# Patient Record
Sex: MALE | Race: WHITE
[De-identification: names, ages, dates, MRNs, and addresses within clinical notes are randomized per-mention and may not be internally consistent; named-entity substitution may affect disease eponyms.]

---

## 2023-01-16 ENCOUNTER — HOSPITAL ENCOUNTER (OUTPATIENT)
Dept: HOSPITAL 76 - DI | Age: 53
Discharge: HOME | End: 2023-01-16
Attending: INTERNAL MEDICINE
Payer: COMMERCIAL

## 2023-01-16 DIAGNOSIS — R22.2: Primary | ICD-10-CM

## 2023-01-17 NOTE — ULTRASOUND REPORT
PROCEDURE:  Chest

 

INDICATIONS:  MASS OF BACK

 

TECHNIQUE:  

Real-time scanning was performed, and a suitable site was marked by the sonographer for thoracentesis
 to be performed by the referring clinician.  

 

COMPARISON:  None.

 

FINDINGS:  

Mass the area of concern in the left upper back near the base of the neck is very well circumscribed 
and measures 6.5 x 3.6 x 5.9 cm and is heterogeneously hypoechoic relative to the skin and adjacent s
ubcutaneous fat. There is no clear connection to the skin.

 

IMPRESSION:  

Nonspecific well-circumscribed subcutaneous mass. This could represent a lipoma although the echogeni
city and complexity of the lesion is significantly different from that of the adjacent subcutaneous f
at. Dermoid or sebaceous cyst are additional differential considerations. Tissue diagnosis fabrizio whitlock

 

Reviewed by: Sina Velasco MD on 1/17/2023 3:33 PM PST

Approved by: Sina Velasco MD on 1/17/2023 3:33 PM PST

 

 

Station ID:  [id number]

## 2023-04-21 ENCOUNTER — HOSPITAL ENCOUNTER (OUTPATIENT)
Dept: HOSPITAL 76 - SDS | Age: 53
Discharge: HOME | End: 2023-04-21
Attending: SURGERY
Payer: COMMERCIAL

## 2023-04-21 VITALS — SYSTOLIC BLOOD PRESSURE: 120 MMHG | DIASTOLIC BLOOD PRESSURE: 85 MMHG

## 2023-04-21 DIAGNOSIS — E66.9: ICD-10-CM

## 2023-04-21 DIAGNOSIS — G47.30: ICD-10-CM

## 2023-04-21 DIAGNOSIS — L72.3: Primary | ICD-10-CM

## 2023-04-21 PROCEDURE — 12042 INTMD RPR N-HF/GENIT2.6-7.5: CPT

## 2023-04-21 PROCEDURE — 11426 EXC H-F-NK-SP B9+MARG >4 CM: CPT

## 2023-04-21 NOTE — ANESTHESIA POST OP EVALUATION
Anesthesia Post Eval





- Post Anesthesia Eval


Vitals: 





                                Last Vital Signs











Temp  36.5 C   04/21/23 15:43


 


Pulse  76   04/21/23 16:02


 


Resp  14   04/21/23 16:02


 


BP  120/85 H  04/21/23 16:02


 


Pulse Ox  96   04/21/23 16:02


 


O2 Flow Rate      











CV Function Including HR & BP: Stable


Pain Control: Satisfactory


Nausea & Vomiting: Negative


Mental Status: Baseline


Respiratory Status: Airway Patent


Hydration Status: Satisfactory


Anesthesia Complications: None

## 2023-04-21 NOTE — ANESTHESIA
Pre-Anesthesia VS, & Labs





- Diagnosis





left posterior neck mass





- Procedure





excision of left posterior neck mass


Vital Signs: 





                                        











Temp Pulse Resp BP Pulse Ox O2 Flow Rate


 


 36.6 C   72   18   129/87 H  99    


 


 04/21/23 11:55  04/21/23 11:55  04/21/23 11:55  04/21/23 11:55  04/21/23 11:55 

 











Height: 5 ft 11 in


Weight (kg): 122.5 kg


Body Mass Index: 37.6


BMI Classification: Obese





- NPO


>8 hours





Home Medications and Allergies


Home Medications: 


Ambulatory Orders





No Known Home Medications  04/13/23 











                                        





No Known Home Medications  04/13/23 








Allergies/Adverse Reactions: 


                                    Allergies











Allergy/AdvReac Type Severity Reaction Status Date / Time


 


No Known Drug Allergies Allergy   Verified 04/13/23 15:53














Anes History & Medical History





- Anesthetic History


Family history of Anesthesia Complications: Denies


Family history of Malignant Hyperthermia: Denies





- Medical History


Cardiovascular: reports: None


Pulmonary: reports: Sleep apnea, CPAP use


Gastrointestinal: reports: None


Urinary: reports: None


Neuro: reports: None


Musculoskeletal: reports: None


Endocrine/Autoimmune: reports: None


Skin: reports: None


Smoking Status: Never smoker


Psychosocial: reports: No issues indicated


History of Cancer?: No





Exam


General: Alert, Oriented x3, Cooperative, No acute distress


Dental: WNL


Mouth Opening: 3 Fingerbreadth


Neck Mobility: Normal


Mallampati classification: III


Thyromental Distance: 4-6 cm





Plan


Anesthesia Type: General, MAC


Consent for Procedure(s) Verified and Reviewed: Yes


Code Status: Attempt Resuscitation


ASA classification: 2-Mild systemic disease


Is this case an emergency?: No

## 2023-04-21 NOTE — OPERATIVE REPORT
Operative Report





- General


Procedure Date: 04/21/23


Planned Procedure: 


Excision LEFT posterior neck mass


Pre-Op Diagnosis: LEFT posterior neck mass


Procedure Performed: 


Excision very large sebaceous cyst LEFT posterior neck


Post Op Diagnosis: Very large LEFT posterior neck mass sebaceous cyst





- Procedure Note


Primary Surgeon: Shaun Pascal MD


Anesthesia Provider: Ethan Armijo CRNA


Anesthesia Technique: Local (30 mL of half percent Marcaine with epinephrine), 

MAC


IV Fluids (mL): 600


Estimated Blood Loss (mL): 10


Drain/Tube Type: Other (None.)


Indications: 


Increasingly symptomatic LEFT posterior neck mass.


Findings: 


Very large greater than 9 cm (diameter) left posterior neck sebaceous cyst


Complications: 


None.





- Other


Other Information/Narrative: 


After verbal and written informed consent was obtained detailing the operation, 

the alternatives the operation including no operation, risks of infection, 

bleeding requiring transfusion with its risks, nerve injury, and death and after

I met with the patient confirming the surgery and the site of surgery, the 

patient was brought to the operative suite and placed 0n his left side on the 

operating table.  Great care was taken to avoid pressure points to prevent 

pressure necrosis or nerve injury.  Monitoring devices were applied along with 

TEDs and pneumatic compression stockings (to prevent DVT).  The patient received

preoperative antibiotics for surgical prophylaxis.  Ethan Armijo CRNA sedated 

and anesthetized the patient for the entire procedure.  The patient was prepped 

and draped in the usual sterile manner.  A "time in" then confirmed that the 

patient was identified with 3 identifiers (name, birth date, and medical record 

number), the history and physical was updated and in the chart, the signed 

consent confirming the procedure was in the chart, the patient was in the 

correct position, the aforementioned prophylactic measures were in place or 

given, we had the correct personnel and equipment to complete the procedure and 

that anesthesia and the surgical team were given an opportunity to express any 

concerns.  With the agreement of everyone in the room we proceeded with the 

operation.





A 4.5 cm incision was made over the mass and dissection was carried out down to 

the using sharp dissection with a scalpel.  It was mildly surprising to find a 

very large sebaceous cyst measuring greater than 9 cm in diameter.  The 

sebaceous cyst was emptied of all of its foul-smelling contents.  With the cyst 

empty the wall was grasped with either Allis clamps or cokers and removed in its

entirety.  This was to prevent recurrence.   Hemostasis was noted.  The local 

was injected at the fascial and skin level.





The subcutaneous tissues were approximated using a 3-0 Vicryl in an interrupted 

simple fashion.  The skin incision was approximated with 4-0 Monocryl in a 

subcuticular fashion.  The remaining local was then injected into the space 

vacated by what was the sebaceous cyst.  Again, a total of 30 mL of half percent

Marcaine with epinephrine was used.  The skin was cleaned of its prep and 

Dermabond was applied.  At this point a timeout was performed that confirmed 

that all counts were correct x2, the procedure that was performed, the blood 

loss, the IV fluids administered, the patient's condition, and any concerns of 

the operating team had.  Having tolerated the procedure well, the patient was 

taken recovery room in good and stable condition.  The plan is for outpatient 

discharge when the patient is adequately recovered.





CPT 40115





This document was created in part using voice recognition technology.  Because 

of the inherent limitations of the system, occasional same sounding word 

substitutions and grammatical errors do occur and persist despite proofreading. 

Please read this document for content.